# Patient Record
Sex: MALE | Race: WHITE
[De-identification: names, ages, dates, MRNs, and addresses within clinical notes are randomized per-mention and may not be internally consistent; named-entity substitution may affect disease eponyms.]

---

## 2019-08-09 ENCOUNTER — HOSPITAL ENCOUNTER (OUTPATIENT)
Dept: HOSPITAL 95 - ORSCSDS | Age: 74
Discharge: HOME | End: 2019-08-09
Attending: ORTHOPAEDIC SURGERY
Payer: MEDICARE

## 2019-08-09 VITALS — HEIGHT: 75 IN | BODY MASS INDEX: 26.86 KG/M2 | WEIGHT: 216.05 LBS

## 2019-08-09 DIAGNOSIS — G56.01: Primary | ICD-10-CM

## 2019-08-09 DIAGNOSIS — F32.9: ICD-10-CM

## 2019-08-09 DIAGNOSIS — I10: ICD-10-CM

## 2019-08-09 DIAGNOSIS — Z79.899: ICD-10-CM

## 2019-08-09 PROCEDURE — 01N50ZZ RELEASE MEDIAN NERVE, OPEN APPROACH: ICD-10-PCS | Performed by: ORTHOPAEDIC SURGERY

## 2019-09-27 ENCOUNTER — HOSPITAL ENCOUNTER (OUTPATIENT)
Dept: HOSPITAL 95 - ORSCSDS | Age: 74
Discharge: HOME | End: 2019-09-27
Attending: ORTHOPAEDIC SURGERY
Payer: MEDICARE

## 2019-09-27 VITALS — WEIGHT: 217.38 LBS | HEIGHT: 75 IN | BODY MASS INDEX: 27.03 KG/M2

## 2019-09-27 DIAGNOSIS — I10: ICD-10-CM

## 2019-09-27 DIAGNOSIS — F32.9: ICD-10-CM

## 2019-09-27 DIAGNOSIS — J45.909: ICD-10-CM

## 2019-09-27 DIAGNOSIS — Z79.899: ICD-10-CM

## 2019-09-27 DIAGNOSIS — Z79.82: ICD-10-CM

## 2019-09-27 DIAGNOSIS — E78.5: ICD-10-CM

## 2019-09-27 DIAGNOSIS — G56.02: Primary | ICD-10-CM

## 2019-09-27 DIAGNOSIS — Z87.891: ICD-10-CM

## 2019-09-27 PROCEDURE — 01N50ZZ RELEASE MEDIAN NERVE, OPEN APPROACH: ICD-10-PCS | Performed by: ORTHOPAEDIC SURGERY

## 2019-12-10 ENCOUNTER — HOSPITAL ENCOUNTER (OUTPATIENT)
Dept: HOSPITAL 95 - ORSCMMR | Age: 74
LOS: 1 days | Discharge: HOME | End: 2019-12-11
Attending: ORTHOPAEDIC SURGERY
Payer: MEDICARE

## 2019-12-10 VITALS — HEIGHT: 75 IN | BODY MASS INDEX: 27.41 KG/M2 | WEIGHT: 220.46 LBS

## 2019-12-10 DIAGNOSIS — I10: ICD-10-CM

## 2019-12-10 DIAGNOSIS — Z01.818: ICD-10-CM

## 2019-12-10 DIAGNOSIS — Z79.899: ICD-10-CM

## 2019-12-10 DIAGNOSIS — Z79.82: ICD-10-CM

## 2019-12-10 DIAGNOSIS — M17.12: Primary | ICD-10-CM

## 2019-12-10 PROCEDURE — 0SRD0JA REPLACEMENT OF LEFT KNEE JOINT WITH SYNTHETIC SUBSTITUTE, UNCEMENTED, OPEN APPROACH: ICD-10-PCS | Performed by: ORTHOPAEDIC SURGERY

## 2019-12-10 PROCEDURE — C1776 JOINT DEVICE (IMPLANTABLE): HCPCS

## 2019-12-10 NOTE — NUR
SHIFT SUMMARY
POD 0 S/P L TKA. A/O X4 WITH VSS. AQUACEL TO LEFT KNEE IN PLACE THAT IS C/D/I
W/NO DRAINAGE NOTED. HAS POLAR PACK, GABI'S, AND SCD'S IN PLACE. PAIN WELL
CONTROLLED WITH PO MEDS. TOLERATING REGULAR DIET, DENIES N/V. UP IN CHAIR FOR
DINNER. WORKED WITH THERAPY THIS AFTERNOON. 1 POST-OP VOID. CURRENTLY
WATCHING TV IN BED WITH CALL LIGHT WITHIN REACH. WILL CONT. TO MONITOR AND
GIVE REPORT TO ON COMING RN.

## 2019-12-10 NOTE — NUR
Ambulatory in Day Surgery.  Surgical site prepped with 2% Chlorhexidine cloth
wipe.  History, Chart, Medications and Allergies reviewed before start of
procedure.Lungs clear T/O to Auscultation.  Patient confirms NPO status and
agrees with scheduled surgery.  Pre-Op teaching done. Pt verbalizes
understanding.  Patient reports completing Chlorhexadine shower X2 prior to
admission to hospital.

## 2019-12-10 NOTE — NUR
FROM PACU TO SURGICAL FLOOR
PT ARRIVED TO UNIT AT APPROX 1120 TODAY VIA HOSPITAL BED. A/OX4 W/VSS. DENIES
ANY DISCOMFORT AT THIS TIME. LEFT KNEE DRESSING IS C/D/I WITH ICE PACK IN
PLACE. DENIES FEELING ANY SENSATION BELOW HIP, UNABLE TO MOVE BLE'S AT THIS
TIME. BLE'S ARE WARM W/BRISK CAP REFILL AND STRONG PULSES. PT DENIES N/V.
CURRENTLY EATING REGULAR LUNCH TRAY WHILE WATCHING TV. ABLE TO USE CALL LIGHT
AND HAS IT WITHIN REACH.

## 2019-12-11 LAB
ANION GAP SERPL CALCULATED.4IONS-SCNC: 6 MMOL/L (ref 6–16)
BASOPHILS # BLD AUTO: 0.02 K/MM3 (ref 0–0.23)
BASOPHILS NFR BLD AUTO: 0 % (ref 0–2)
BUN SERPL-MCNC: 27 MG/DL (ref 8–24)
CALCIUM SERPL-MCNC: 8.2 MG/DL (ref 8.5–10.1)
CHLORIDE SERPL-SCNC: 107 MMOL/L (ref 98–108)
CO2 SERPL-SCNC: 25 MMOL/L (ref 21–32)
CREAT SERPL-MCNC: 0.93 MG/DL (ref 0.6–1.2)
DEPRECATED RDW RBC AUTO: 50.3 FL (ref 35.1–46.3)
EOSINOPHIL # BLD AUTO: 0.01 K/MM3 (ref 0–0.68)
EOSINOPHIL NFR BLD AUTO: 0 % (ref 0–6)
ERYTHROCYTE [DISTWIDTH] IN BLOOD BY AUTOMATED COUNT: 14.1 % (ref 11.7–14.2)
GLUCOSE SERPL-MCNC: 125 MG/DL (ref 70–99)
HCT VFR BLD AUTO: 34.9 % (ref 37–53)
HGB BLD-MCNC: 11.5 G/DL (ref 13.5–17.5)
IMM GRANULOCYTES # BLD AUTO: 0.06 K/MM3 (ref 0–0.1)
IMM GRANULOCYTES NFR BLD AUTO: 1 % (ref 0–1)
LYMPHOCYTES # BLD AUTO: 1.63 K/MM3 (ref 0.84–5.2)
LYMPHOCYTES NFR BLD AUTO: 14 % (ref 21–46)
MCHC RBC AUTO-ENTMCNC: 33 G/DL (ref 31.5–36.5)
MCV RBC AUTO: 96 FL (ref 80–100)
MONOCYTES # BLD AUTO: 0.88 K/MM3 (ref 0.16–1.47)
MONOCYTES NFR BLD AUTO: 8 % (ref 4–13)
NEUTROPHILS # BLD AUTO: 8.97 K/MM3 (ref 1.96–9.15)
NEUTROPHILS NFR BLD AUTO: 78 % (ref 41–73)
NRBC # BLD AUTO: 0 K/MM3 (ref 0–0.02)
NRBC BLD AUTO-RTO: 0 /100 WBC (ref 0–0.2)
PLATELET # BLD AUTO: 251 K/MM3 (ref 150–400)
POTASSIUM SERPL-SCNC: 4.4 MMOL/L (ref 3.5–5.5)
SODIUM SERPL-SCNC: 138 MMOL/L (ref 136–145)

## 2019-12-11 NOTE — NUR
DISCHARGE:
PT EATING AND DRINKING, VOIDING, PASSING GAS. PT REPORTS PAIN TOLERABLE ON PO
PAIN MEDICATION. PT REPORTS HAVING WALKER AT HOME. PT/FAMILY REPORTS
UNDERSTANDING OF DISCHARGE INSTRUCTIONS INCLUDING ICE MACHINE AND DRESSING
CHANGES. PT SENT WITH PAPERWORK AND DRESSING SUPPLIES. PT CLEARED BY THERAPY
TO GO HOME.

## 2019-12-11 NOTE — NUR
SHIFT SUMMARY
POD1 LTKA. PT AAOX4, VSS. AQUACEL IN PLACE CDI. PATIENT HAS BEEN UP AND
AMBULATING WITH FWW/GB. PT HAS VOIDED DURING SHIFT. MEDICATED PER EMAR FOR
PAIN. PATIENT HAS BEEN RESTING IN BED DURING SHIFT. DENIES NAUSEA.

## 2020-05-25 ENCOUNTER — HOSPITAL ENCOUNTER (EMERGENCY)
Dept: HOSPITAL 95 - ER | Age: 75
Discharge: HOME | End: 2020-05-25
Payer: MEDICARE

## 2020-05-25 VITALS — BODY MASS INDEX: 26.11 KG/M2 | WEIGHT: 209.99 LBS | HEIGHT: 75 IN

## 2020-05-25 DIAGNOSIS — Z79.899: ICD-10-CM

## 2020-05-25 DIAGNOSIS — Z96.651: ICD-10-CM

## 2020-05-25 DIAGNOSIS — Z79.82: ICD-10-CM

## 2020-05-25 DIAGNOSIS — M65.862: Primary | ICD-10-CM

## 2020-06-02 ENCOUNTER — HOSPITAL ENCOUNTER (EMERGENCY)
Dept: HOSPITAL 95 - ER | Age: 75
Discharge: HOME | End: 2020-06-02
Payer: MEDICARE

## 2020-06-02 VITALS — WEIGHT: 214.99 LBS | BODY MASS INDEX: 26.73 KG/M2 | HEIGHT: 75 IN

## 2020-06-02 DIAGNOSIS — M25.562: Primary | ICD-10-CM

## 2020-06-02 DIAGNOSIS — Z87.891: ICD-10-CM

## 2020-06-02 DIAGNOSIS — Z79.899: ICD-10-CM

## 2020-06-02 DIAGNOSIS — Z79.82: ICD-10-CM

## 2020-06-02 PROCEDURE — A9270 NON-COVERED ITEM OR SERVICE: HCPCS

## 2020-06-03 ENCOUNTER — HOSPITAL ENCOUNTER (OUTPATIENT)
Dept: HOSPITAL 95 - LAB SHORT | Age: 75
Discharge: HOME | End: 2020-06-03
Attending: ORTHOPAEDIC SURGERY
Payer: MEDICARE

## 2020-06-03 DIAGNOSIS — Z47.1: Primary | ICD-10-CM

## 2020-06-03 DIAGNOSIS — Z96.652: ICD-10-CM

## 2020-06-03 LAB
EOSINOPHIL NFR FLD MANUAL: 1 % (ref 0–2)
LYMPHOCYTES NFR FLD MANUAL: 7 % (ref 0–15)
MONONUC CELLS NFR FLD MANUAL: 2 % (ref 0–65)
NEUTS SEG NFR FLD MANUAL: 90 % (ref 0–24)
RBC # FLD MANUAL: (no result) /MM3 (ref 0–0)
RBC # FLD MANUAL: 0.32 M/MM3 (ref 0–0)
WBC # SNV MANUAL: (no result) /MM3 (ref 0–180)

## 2020-06-25 ENCOUNTER — HOSPITAL ENCOUNTER (OUTPATIENT)
Dept: HOSPITAL 95 - LAB | Age: 75
Discharge: HOME | End: 2020-06-25
Attending: INTERNAL MEDICINE
Payer: MEDICARE

## 2020-06-25 DIAGNOSIS — Z79.2: ICD-10-CM

## 2020-06-25 DIAGNOSIS — T84.54XA: Primary | ICD-10-CM

## 2020-06-25 DIAGNOSIS — B96.5: ICD-10-CM

## 2020-06-25 LAB
ALBUMIN SERPL BCP-MCNC: 2.5 G/DL (ref 3.4–5)
ALBUMIN/GLOB SERPL: 0.5 {RATIO} (ref 0.8–1.8)
ALT SERPL W P-5'-P-CCNC: 21 U/L (ref 12–78)
ANION GAP SERPL CALCULATED.4IONS-SCNC: 3 MMOL/L (ref 6–16)
AST SERPL W P-5'-P-CCNC: 22 U/L (ref 12–37)
BASOPHILS # BLD AUTO: 0.08 K/MM3 (ref 0–0.23)
BASOPHILS NFR BLD AUTO: 2 % (ref 0–2)
BILIRUB SERPL-MCNC: 0.4 MG/DL (ref 0.1–1)
BUN SERPL-MCNC: 10 MG/DL (ref 8–24)
CALCIUM SERPL-MCNC: 9.2 MG/DL (ref 8.5–10.1)
CHLORIDE SERPL-SCNC: 105 MMOL/L (ref 98–108)
CO2 SERPL-SCNC: 30 MMOL/L (ref 21–32)
CREAT SERPL-MCNC: 0.75 MG/DL (ref 0.6–1.2)
DEPRECATED RDW RBC AUTO: 50.5 FL (ref 35.1–46.3)
EOSINOPHIL # BLD AUTO: 0.15 K/MM3 (ref 0–0.68)
EOSINOPHIL NFR BLD AUTO: 3 % (ref 0–6)
ERYTHROCYTE [DISTWIDTH] IN BLOOD BY AUTOMATED COUNT: 14.8 % (ref 11.7–14.2)
GLOBULIN SER CALC-MCNC: 4.6 G/DL (ref 2.2–4)
GLUCOSE SERPL-MCNC: 94 MG/DL (ref 70–99)
HCT VFR BLD AUTO: 30.2 % (ref 37–53)
HGB BLD-MCNC: 9.3 G/DL (ref 13.5–17.5)
IMM GRANULOCYTES # BLD AUTO: 0.08 K/MM3 (ref 0–0.1)
IMM GRANULOCYTES NFR BLD AUTO: 2 % (ref 0–1)
LYMPHOCYTES # BLD AUTO: 1.55 K/MM3 (ref 0.84–5.2)
LYMPHOCYTES NFR BLD AUTO: 29 % (ref 21–46)
MCHC RBC AUTO-ENTMCNC: 30.8 G/DL (ref 31.5–36.5)
MCV RBC AUTO: 94 FL (ref 80–100)
MONOCYTES # BLD AUTO: 0.43 K/MM3 (ref 0.16–1.47)
MONOCYTES NFR BLD AUTO: 8 % (ref 4–13)
NEUTROPHILS # BLD AUTO: 3.13 K/MM3 (ref 1.96–9.15)
NEUTROPHILS NFR BLD AUTO: 58 % (ref 41–73)
NRBC # BLD AUTO: 0 K/MM3 (ref 0–0.02)
NRBC BLD AUTO-RTO: 0 /100 WBC (ref 0–0.2)
PLATELET # BLD AUTO: 462 K/MM3 (ref 150–400)
POTASSIUM SERPL-SCNC: 4.1 MMOL/L (ref 3.5–5.5)
PROT SERPL-MCNC: 7.1 G/DL (ref 6.4–8.2)
SODIUM SERPL-SCNC: 138 MMOL/L (ref 136–145)

## 2020-06-29 ENCOUNTER — HOSPITAL ENCOUNTER (OUTPATIENT)
Dept: HOSPITAL 95 - LAB | Age: 75
Discharge: HOME | End: 2020-06-29
Attending: ORTHOPAEDIC SURGERY
Payer: MEDICARE

## 2020-06-29 DIAGNOSIS — Z79.2: ICD-10-CM

## 2020-06-29 DIAGNOSIS — T84.54XD: Primary | ICD-10-CM

## 2020-06-29 DIAGNOSIS — B96.5: ICD-10-CM

## 2020-06-29 LAB
ALBUMIN SERPL BCP-MCNC: 2.5 G/DL (ref 3.4–5)
ALBUMIN/GLOB SERPL: 0.6 {RATIO} (ref 0.8–1.8)
ALT SERPL W P-5'-P-CCNC: 15 U/L (ref 12–78)
ANION GAP SERPL CALCULATED.4IONS-SCNC: 6 MMOL/L (ref 6–16)
AST SERPL W P-5'-P-CCNC: 13 U/L (ref 12–37)
BILIRUB SERPL-MCNC: 0.4 MG/DL (ref 0.1–1)
BUN SERPL-MCNC: 13 MG/DL (ref 8–24)
CALCIUM SERPL-MCNC: 8.7 MG/DL (ref 8.5–10.1)
CHLORIDE SERPL-SCNC: 107 MMOL/L (ref 98–108)
CO2 SERPL-SCNC: 28 MMOL/L (ref 21–32)
CREAT SERPL-MCNC: 0.75 MG/DL (ref 0.6–1.2)
GLOBULIN SER CALC-MCNC: 4.4 G/DL (ref 2.2–4)
GLUCOSE SERPL-MCNC: 104 MG/DL (ref 70–99)
POTASSIUM SERPL-SCNC: 3.8 MMOL/L (ref 3.5–5.5)
PROT SERPL-MCNC: 6.9 G/DL (ref 6.4–8.2)
SODIUM SERPL-SCNC: 141 MMOL/L (ref 136–145)

## 2020-06-30 ENCOUNTER — HOSPITAL ENCOUNTER (OUTPATIENT)
Dept: HOSPITAL 95 - LAB SHORT | Age: 75
Discharge: HOME | End: 2020-06-30
Attending: ORTHOPAEDIC SURGERY
Payer: MEDICARE

## 2020-06-30 DIAGNOSIS — B96.5: ICD-10-CM

## 2020-06-30 DIAGNOSIS — Z79.2: ICD-10-CM

## 2020-06-30 DIAGNOSIS — T84.54XD: Primary | ICD-10-CM

## 2020-06-30 LAB
DEPRECATED RDW RBC AUTO: 51.5 FL (ref 35.1–46.3)
ERYTHROCYTE [DISTWIDTH] IN BLOOD BY AUTOMATED COUNT: 15.3 % (ref 11.7–14.2)
HCT VFR BLD AUTO: 32.7 % (ref 37–53)
HGB BLD-MCNC: 10.2 G/DL (ref 13.5–17.5)
MCHC RBC AUTO-ENTMCNC: 31.2 G/DL (ref 31.5–36.5)
MCV RBC AUTO: 93 FL (ref 80–100)
NRBC # BLD AUTO: 0 K/MM3 (ref 0–0.02)
NRBC BLD AUTO-RTO: 0 /100 WBC (ref 0–0.2)
PLATELET # BLD AUTO: 448 K/MM3 (ref 150–400)

## 2020-07-02 ENCOUNTER — HOSPITAL ENCOUNTER (OUTPATIENT)
Dept: HOSPITAL 95 - LAB SHORT | Age: 75
Discharge: HOME | End: 2020-07-02
Attending: ORTHOPAEDIC SURGERY
Payer: MEDICARE

## 2020-07-02 DIAGNOSIS — L76.32: Primary | ICD-10-CM

## 2020-07-02 LAB
BASOPHILS # BLD AUTO: 0.06 K/MM3 (ref 0–0.23)
BASOPHILS NFR BLD AUTO: 1 % (ref 0–2)
DEPRECATED RDW RBC AUTO: 52.5 FL (ref 35.1–46.3)
EOSINOPHIL # BLD AUTO: 0.1 K/MM3 (ref 0–0.68)
EOSINOPHIL NFR BLD AUTO: 2 % (ref 0–6)
ERYTHROCYTE [DISTWIDTH] IN BLOOD BY AUTOMATED COUNT: 15.4 % (ref 11.7–14.2)
HCT VFR BLD AUTO: 32.9 % (ref 37–53)
HGB BLD-MCNC: 10.1 G/DL (ref 13.5–17.5)
IMM GRANULOCYTES # BLD AUTO: 0.05 K/MM3 (ref 0–0.1)
IMM GRANULOCYTES NFR BLD AUTO: 1 % (ref 0–1)
LYMPHOCYTES # BLD AUTO: 1.17 K/MM3 (ref 0.84–5.2)
LYMPHOCYTES NFR BLD AUTO: 24 % (ref 21–46)
MCHC RBC AUTO-ENTMCNC: 30.7 G/DL (ref 31.5–36.5)
MCV RBC AUTO: 93 FL (ref 80–100)
MONOCYTES # BLD AUTO: 0.39 K/MM3 (ref 0.16–1.47)
MONOCYTES NFR BLD AUTO: 8 % (ref 4–13)
NEUTROPHILS # BLD AUTO: 3.21 K/MM3 (ref 1.96–9.15)
NEUTROPHILS NFR BLD AUTO: 65 % (ref 41–73)
NRBC # BLD AUTO: 0 K/MM3 (ref 0–0.02)
NRBC BLD AUTO-RTO: 0 /100 WBC (ref 0–0.2)
PLATELET # BLD AUTO: 451 K/MM3 (ref 150–400)

## 2020-08-03 ENCOUNTER — HOSPITAL ENCOUNTER (OUTPATIENT)
Dept: HOSPITAL 95 - LAB SHORT | Age: 75
Discharge: HOME | End: 2020-08-03
Attending: ORTHOPAEDIC SURGERY
Payer: MEDICARE

## 2020-08-03 DIAGNOSIS — Z79.2: ICD-10-CM

## 2020-08-03 DIAGNOSIS — Z48.00: Primary | ICD-10-CM

## 2020-08-03 DIAGNOSIS — M97.12XD: ICD-10-CM

## 2020-08-03 DIAGNOSIS — B96.5: ICD-10-CM

## 2020-08-03 DIAGNOSIS — T84.54XA: ICD-10-CM

## 2020-08-03 DIAGNOSIS — S82.002D: ICD-10-CM

## 2020-08-03 LAB
ALBUMIN SERPL BCP-MCNC: 3.2 G/DL (ref 3.4–5)
ALBUMIN/GLOB SERPL: 0.8 {RATIO} (ref 0.8–1.8)
ALT SERPL W P-5'-P-CCNC: 14 U/L (ref 12–78)
ANION GAP SERPL CALCULATED.4IONS-SCNC: 4 MMOL/L (ref 6–16)
AST SERPL W P-5'-P-CCNC: 17 U/L (ref 12–37)
BASOPHILS # BLD AUTO: 0.06 K/MM3 (ref 0–0.23)
BASOPHILS NFR BLD AUTO: 1 % (ref 0–2)
BILIRUB SERPL-MCNC: 0.5 MG/DL (ref 0.1–1)
BUN SERPL-MCNC: 15 MG/DL (ref 8–24)
CALCIUM SERPL-MCNC: 9.4 MG/DL (ref 8.5–10.1)
CHLORIDE SERPL-SCNC: 104 MMOL/L (ref 98–108)
CO2 SERPL-SCNC: 29 MMOL/L (ref 21–32)
CREAT SERPL-MCNC: 0.73 MG/DL (ref 0.6–1.2)
CRP SERPL-MCNC: 1.32 MG/DL (ref 0–0.3)
DEPRECATED RDW RBC AUTO: 53.5 FL (ref 35.1–46.3)
EOSINOPHIL # BLD AUTO: 0.11 K/MM3 (ref 0–0.68)
EOSINOPHIL NFR BLD AUTO: 2 % (ref 0–6)
ERYTHROCYTE [DISTWIDTH] IN BLOOD BY AUTOMATED COUNT: 15.9 % (ref 11.7–14.2)
GLOBULIN SER CALC-MCNC: 4 G/DL (ref 2.2–4)
GLUCOSE SERPL-MCNC: 106 MG/DL (ref 70–99)
HCT VFR BLD AUTO: 36.4 % (ref 37–53)
HGB BLD-MCNC: 11.6 G/DL (ref 13.5–17.5)
IMM GRANULOCYTES # BLD AUTO: 0.03 K/MM3 (ref 0–0.1)
IMM GRANULOCYTES NFR BLD AUTO: 1 % (ref 0–1)
LYMPHOCYTES # BLD AUTO: 2.09 K/MM3 (ref 0.84–5.2)
LYMPHOCYTES NFR BLD AUTO: 33 % (ref 21–46)
MCHC RBC AUTO-ENTMCNC: 31.9 G/DL (ref 31.5–36.5)
MCV RBC AUTO: 92 FL (ref 80–100)
MONOCYTES # BLD AUTO: 0.61 K/MM3 (ref 0.16–1.47)
MONOCYTES NFR BLD AUTO: 10 % (ref 4–13)
NEUTROPHILS # BLD AUTO: 3.43 K/MM3 (ref 1.96–9.15)
NEUTROPHILS NFR BLD AUTO: 54 % (ref 41–73)
NRBC # BLD AUTO: 0 K/MM3 (ref 0–0.02)
NRBC BLD AUTO-RTO: 0 /100 WBC (ref 0–0.2)
PLATELET # BLD AUTO: 361 K/MM3 (ref 150–400)
POTASSIUM SERPL-SCNC: 4 MMOL/L (ref 3.5–5.5)
PROT SERPL-MCNC: 7.2 G/DL (ref 6.4–8.2)
SODIUM SERPL-SCNC: 137 MMOL/L (ref 136–145)

## 2020-10-03 ENCOUNTER — HOSPITAL ENCOUNTER (OUTPATIENT)
Dept: HOSPITAL 95 - LAB | Age: 75
Discharge: HOME | End: 2020-10-03
Attending: PHYSICIAN ASSISTANT
Payer: MEDICARE

## 2020-10-03 DIAGNOSIS — D64.9: Primary | ICD-10-CM

## 2022-06-20 ENCOUNTER — HOSPITAL ENCOUNTER (OUTPATIENT)
Dept: HOSPITAL 95 - LAB SHORT | Age: 77
Discharge: HOME | End: 2022-06-20
Attending: FAMILY MEDICINE
Payer: MEDICARE

## 2022-06-20 DIAGNOSIS — T14.8XXA: Primary | ICD-10-CM

## 2025-02-20 ENCOUNTER — HOSPITAL ENCOUNTER (OUTPATIENT)
Dept: HOSPITAL 95 - ORSCSDS | Age: 80
Discharge: HOME | End: 2025-02-20
Attending: OPHTHALMOLOGY
Payer: MEDICARE

## 2025-02-20 VITALS — DIASTOLIC BLOOD PRESSURE: 61 MMHG | SYSTOLIC BLOOD PRESSURE: 108 MMHG

## 2025-02-20 VITALS — WEIGHT: 220.24 LBS | HEIGHT: 75 IN | BODY MASS INDEX: 27.38 KG/M2

## 2025-02-20 DIAGNOSIS — Z79.899: ICD-10-CM

## 2025-02-20 DIAGNOSIS — E78.5: ICD-10-CM

## 2025-02-20 DIAGNOSIS — Z87.891: ICD-10-CM

## 2025-02-20 DIAGNOSIS — J44.9: ICD-10-CM

## 2025-02-20 DIAGNOSIS — H40.1131: ICD-10-CM

## 2025-02-20 DIAGNOSIS — I25.10: ICD-10-CM

## 2025-02-20 DIAGNOSIS — F32.A: ICD-10-CM

## 2025-02-20 DIAGNOSIS — H25.812: Primary | ICD-10-CM

## 2025-02-20 DIAGNOSIS — Z96.1: ICD-10-CM

## 2025-02-20 DIAGNOSIS — I10: ICD-10-CM

## 2025-02-20 DIAGNOSIS — Z79.82: ICD-10-CM

## 2025-02-20 PROCEDURE — 08933ZZ DRAINAGE OF LEFT ANTERIOR CHAMBER, PERCUTANEOUS APPROACH: ICD-10-PCS | Performed by: OPHTHALMOLOGY

## 2025-02-20 PROCEDURE — 08RK3JZ REPLACEMENT OF LEFT LENS WITH SYNTHETIC SUBSTITUTE, PERCUTANEOUS APPROACH: ICD-10-PCS | Performed by: OPHTHALMOLOGY

## 2025-02-20 PROCEDURE — V2632 POST CHMBR INTRAOCULAR LENS: HCPCS
